# Patient Record
Sex: FEMALE | Race: WHITE | NOT HISPANIC OR LATINO | Employment: OTHER | URBAN - METROPOLITAN AREA
[De-identification: names, ages, dates, MRNs, and addresses within clinical notes are randomized per-mention and may not be internally consistent; named-entity substitution may affect disease eponyms.]

---

## 2019-02-23 ENCOUNTER — APPOINTMENT (OUTPATIENT)
Dept: RADIOLOGY | Facility: MEDICAL CENTER | Age: 79
End: 2019-02-23
Attending: EMERGENCY MEDICINE
Payer: MEDICARE

## 2019-02-23 ENCOUNTER — HOSPITAL ENCOUNTER (OUTPATIENT)
Facility: MEDICAL CENTER | Age: 79
End: 2019-02-24
Attending: EMERGENCY MEDICINE | Admitting: INTERNAL MEDICINE
Payer: MEDICARE

## 2019-02-23 DIAGNOSIS — R55 SYNCOPE, UNSPECIFIED SYNCOPE TYPE: ICD-10-CM

## 2019-02-23 PROBLEM — I10 ESSENTIAL HYPERTENSION: Status: ACTIVE | Noted: 2019-02-23

## 2019-02-23 PROBLEM — D64.9 ANEMIA: Status: ACTIVE | Noted: 2019-02-23

## 2019-02-23 PROBLEM — E03.9 HYPOTHYROIDISM: Status: ACTIVE | Noted: 2019-02-23

## 2019-02-23 LAB
ALBUMIN SERPL BCP-MCNC: 4.1 G/DL (ref 3.2–4.9)
ALBUMIN/GLOB SERPL: 1.4 G/DL
ALP SERPL-CCNC: 66 U/L (ref 30–99)
ALT SERPL-CCNC: 12 U/L (ref 2–50)
ANION GAP SERPL CALC-SCNC: 8 MMOL/L (ref 0–11.9)
APPEARANCE UR: CLEAR
APTT PPP: 43 SEC (ref 24.7–36)
AST SERPL-CCNC: 21 U/L (ref 12–45)
BACTERIA #/AREA URNS HPF: NEGATIVE /HPF
BASOPHILS # BLD AUTO: 1.3 % (ref 0–1.8)
BASOPHILS # BLD: 0.07 K/UL (ref 0–0.12)
BILIRUB SERPL-MCNC: 0.6 MG/DL (ref 0.1–1.5)
BILIRUB UR QL STRIP.AUTO: NEGATIVE
BUN SERPL-MCNC: 14 MG/DL (ref 8–22)
CALCIUM SERPL-MCNC: 10.3 MG/DL (ref 8.5–10.5)
CHLORIDE SERPL-SCNC: 107 MMOL/L (ref 96–112)
CO2 SERPL-SCNC: 24 MMOL/L (ref 20–33)
COLOR UR: YELLOW
CREAT SERPL-MCNC: 0.78 MG/DL (ref 0.5–1.4)
EKG IMPRESSION: NORMAL
EOSINOPHIL # BLD AUTO: 0.13 K/UL (ref 0–0.51)
EOSINOPHIL NFR BLD: 2.5 % (ref 0–6.9)
EPI CELLS #/AREA URNS HPF: NEGATIVE /HPF
ERYTHROCYTE [DISTWIDTH] IN BLOOD BY AUTOMATED COUNT: 41.4 FL (ref 35.9–50)
GLOBULIN SER CALC-MCNC: 2.9 G/DL (ref 1.9–3.5)
GLUCOSE SERPL-MCNC: 91 MG/DL (ref 65–99)
GLUCOSE UR STRIP.AUTO-MCNC: NEGATIVE MG/DL
HCT VFR BLD AUTO: 31.2 % (ref 37–47)
HGB BLD-MCNC: 10.5 G/DL (ref 12–16)
HYALINE CASTS #/AREA URNS LPF: ABNORMAL /LPF
IMM GRANULOCYTES # BLD AUTO: 0.01 K/UL (ref 0–0.11)
IMM GRANULOCYTES NFR BLD AUTO: 0.2 % (ref 0–0.9)
INR PPP: 0.98 (ref 0.87–1.13)
KETONES UR STRIP.AUTO-MCNC: NEGATIVE MG/DL
LEUKOCYTE ESTERASE UR QL STRIP.AUTO: ABNORMAL
LYMPHOCYTES # BLD AUTO: 2.49 K/UL (ref 1–4.8)
LYMPHOCYTES NFR BLD: 47 % (ref 22–41)
MCH RBC QN AUTO: 31 PG (ref 27–33)
MCHC RBC AUTO-ENTMCNC: 33.7 G/DL (ref 33.6–35)
MCV RBC AUTO: 92 FL (ref 81.4–97.8)
MICRO URNS: ABNORMAL
MONOCYTES # BLD AUTO: 0.68 K/UL (ref 0–0.85)
MONOCYTES NFR BLD AUTO: 12.8 % (ref 0–13.4)
NEUTROPHILS # BLD AUTO: 1.92 K/UL (ref 2–7.15)
NEUTROPHILS NFR BLD: 36.2 % (ref 44–72)
NITRITE UR QL STRIP.AUTO: NEGATIVE
NRBC # BLD AUTO: 0 K/UL
NRBC BLD-RTO: 0 /100 WBC
PH UR STRIP.AUTO: 7.5 [PH]
PLATELET # BLD AUTO: 255 K/UL (ref 164–446)
PMV BLD AUTO: 9.4 FL (ref 9–12.9)
POTASSIUM SERPL-SCNC: 3.6 MMOL/L (ref 3.6–5.5)
PROT SERPL-MCNC: 7 G/DL (ref 6–8.2)
PROT UR QL STRIP: NEGATIVE MG/DL
PROTHROMBIN TIME: 13.1 SEC (ref 12–14.6)
RBC # BLD AUTO: 3.39 M/UL (ref 4.2–5.4)
RBC # URNS HPF: ABNORMAL /HPF
RBC UR QL AUTO: NEGATIVE
SODIUM SERPL-SCNC: 139 MMOL/L (ref 135–145)
SP GR UR STRIP.AUTO: 1.01
TROPONIN I SERPL-MCNC: <0.01 NG/ML (ref 0–0.04)
UROBILINOGEN UR STRIP.AUTO-MCNC: 0.2 MG/DL
WBC # BLD AUTO: 5.3 K/UL (ref 4.8–10.8)
WBC #/AREA URNS HPF: ABNORMAL /HPF

## 2019-02-23 PROCEDURE — 36415 COLL VENOUS BLD VENIPUNCTURE: CPT

## 2019-02-23 PROCEDURE — 99220 PR INITIAL OBSERVATION CARE,LEVL III: CPT | Performed by: INTERNAL MEDICINE

## 2019-02-23 PROCEDURE — 700111 HCHG RX REV CODE 636 W/ 250 OVERRIDE (IP): Performed by: EMERGENCY MEDICINE

## 2019-02-23 PROCEDURE — 700101 HCHG RX REV CODE 250: Performed by: INTERNAL MEDICINE

## 2019-02-23 PROCEDURE — 84484 ASSAY OF TROPONIN QUANT: CPT

## 2019-02-23 PROCEDURE — 85730 THROMBOPLASTIN TIME PARTIAL: CPT

## 2019-02-23 PROCEDURE — 99285 EMERGENCY DEPT VISIT HI MDM: CPT

## 2019-02-23 PROCEDURE — 96374 THER/PROPH/DIAG INJ IV PUSH: CPT

## 2019-02-23 PROCEDURE — 85610 PROTHROMBIN TIME: CPT

## 2019-02-23 PROCEDURE — 93005 ELECTROCARDIOGRAM TRACING: CPT | Performed by: EMERGENCY MEDICINE

## 2019-02-23 PROCEDURE — G0378 HOSPITAL OBSERVATION PER HR: HCPCS

## 2019-02-23 PROCEDURE — 700105 HCHG RX REV CODE 258: Performed by: EMERGENCY MEDICINE

## 2019-02-23 PROCEDURE — 85025 COMPLETE CBC W/AUTO DIFF WBC: CPT

## 2019-02-23 PROCEDURE — 71045 X-RAY EXAM CHEST 1 VIEW: CPT

## 2019-02-23 PROCEDURE — 80053 COMPREHEN METABOLIC PANEL: CPT

## 2019-02-23 PROCEDURE — 81001 URINALYSIS AUTO W/SCOPE: CPT

## 2019-02-23 RX ORDER — ONDANSETRON 2 MG/ML
4 INJECTION INTRAMUSCULAR; INTRAVENOUS EVERY 4 HOURS PRN
Status: DISCONTINUED | OUTPATIENT
Start: 2019-02-23 | End: 2019-02-24 | Stop reason: HOSPADM

## 2019-02-23 RX ORDER — ONDANSETRON 2 MG/ML
4 INJECTION INTRAMUSCULAR; INTRAVENOUS ONCE
Status: COMPLETED | OUTPATIENT
Start: 2019-02-23 | End: 2019-02-23

## 2019-02-23 RX ORDER — UREA 10 %
500 LOTION (ML) TOPICAL DAILY
Status: DISCONTINUED | OUTPATIENT
Start: 2019-02-24 | End: 2019-02-24 | Stop reason: HOSPADM

## 2019-02-23 RX ORDER — MAGNESIUM GLUCONATE 27 MG(500)
500 TABLET ORAL DAILY
COMMUNITY

## 2019-02-23 RX ORDER — AMLODIPINE BESYLATE 5 MG/1
5 TABLET ORAL DAILY
COMMUNITY

## 2019-02-23 RX ORDER — POLYETHYLENE GLYCOL 3350 17 G/17G
1 POWDER, FOR SOLUTION ORAL
Status: DISCONTINUED | OUTPATIENT
Start: 2019-02-23 | End: 2019-02-24

## 2019-02-23 RX ORDER — ACETAMINOPHEN 325 MG/1
650 TABLET ORAL EVERY 6 HOURS PRN
Status: DISCONTINUED | OUTPATIENT
Start: 2019-02-23 | End: 2019-02-24 | Stop reason: HOSPADM

## 2019-02-23 RX ORDER — CHOLECALCIFEROL (VITAMIN D3) 50 MCG
2000 TABLET ORAL DAILY
COMMUNITY

## 2019-02-23 RX ORDER — SODIUM CHLORIDE AND POTASSIUM CHLORIDE 150; 900 MG/100ML; MG/100ML
INJECTION, SOLUTION INTRAVENOUS CONTINUOUS
Status: DISCONTINUED | OUTPATIENT
Start: 2019-02-23 | End: 2019-02-24

## 2019-02-23 RX ORDER — ONDANSETRON 4 MG/1
4 TABLET, ORALLY DISINTEGRATING ORAL EVERY 4 HOURS PRN
Status: DISCONTINUED | OUTPATIENT
Start: 2019-02-23 | End: 2019-02-24 | Stop reason: HOSPADM

## 2019-02-23 RX ORDER — LISINOPRIL 20 MG/1
20 TABLET ORAL DAILY
COMMUNITY

## 2019-02-23 RX ORDER — SODIUM CHLORIDE 9 MG/ML
1000 INJECTION, SOLUTION INTRAVENOUS ONCE
Status: COMPLETED | OUTPATIENT
Start: 2019-02-23 | End: 2019-02-23

## 2019-02-23 RX ORDER — LEVOTHYROXINE SODIUM 0.12 MG/1
62.5 TABLET ORAL
Status: DISCONTINUED | OUTPATIENT
Start: 2019-02-24 | End: 2019-02-24 | Stop reason: HOSPADM

## 2019-02-23 RX ORDER — LEVOTHYROXINE SODIUM 0.12 MG/1
62.5 TABLET ORAL
COMMUNITY

## 2019-02-23 RX ORDER — AMOXICILLIN 250 MG
2 CAPSULE ORAL 2 TIMES DAILY
Status: DISCONTINUED | OUTPATIENT
Start: 2019-02-23 | End: 2019-02-24

## 2019-02-23 RX ORDER — BISACODYL 10 MG
10 SUPPOSITORY, RECTAL RECTAL
Status: DISCONTINUED | OUTPATIENT
Start: 2019-02-23 | End: 2019-02-24

## 2019-02-23 RX ADMIN — POTASSIUM CHLORIDE AND SODIUM CHLORIDE: 900; 150 INJECTION, SOLUTION INTRAVENOUS at 22:27

## 2019-02-23 RX ADMIN — ONDANSETRON 4 MG: 2 INJECTION INTRAMUSCULAR; INTRAVENOUS at 19:26

## 2019-02-23 RX ADMIN — SODIUM CHLORIDE 1000 ML: 9 INJECTION, SOLUTION INTRAVENOUS at 19:26

## 2019-02-23 ASSESSMENT — ENCOUNTER SYMPTOMS
FEVER: 0
WHEEZING: 0
FLANK PAIN: 0
NAUSEA: 1
DIZZINESS: 1
SEIZURES: 0
LOSS OF CONSCIOUSNESS: 1
FOCAL WEAKNESS: 0
SORE THROAT: 0
BLURRED VISION: 0
BRUISES/BLEEDS EASILY: 0
NECK PAIN: 0
PALPITATIONS: 0
BLOOD IN STOOL: 0
SHORTNESS OF BREATH: 0
ABDOMINAL PAIN: 0
MYALGIAS: 0
DIARRHEA: 0
VOMITING: 0
DIAPHORESIS: 0
SPUTUM PRODUCTION: 0
HEADACHES: 0
CHILLS: 0
COUGH: 0
BACK PAIN: 0

## 2019-02-23 ASSESSMENT — LIFESTYLE VARIABLES
HOW MANY TIMES IN THE PAST YEAR HAVE YOU HAD 5 OR MORE DRINKS IN A DAY: 0
EVER FELT BAD OR GUILTY ABOUT YOUR DRINKING: NO
TOTAL SCORE: 0
TOTAL SCORE: 0
AVERAGE NUMBER OF DAYS PER WEEK YOU HAVE A DRINK CONTAINING ALCOHOL: 1
ON A TYPICAL DAY WHEN YOU DRINK ALCOHOL HOW MANY DRINKS DO YOU HAVE: 2
CONSUMPTION TOTAL: NEGATIVE
TOTAL SCORE: 0
EVER_SMOKED: YES
EVER HAD A DRINK FIRST THING IN THE MORNING TO STEADY YOUR NERVES TO GET RID OF A HANGOVER: NO
HAVE YOU EVER FELT YOU SHOULD CUT DOWN ON YOUR DRINKING: NO
HAVE PEOPLE ANNOYED YOU BY CRITICIZING YOUR DRINKING: NO
ALCOHOL_USE: YES

## 2019-02-23 ASSESSMENT — COPD QUESTIONNAIRES
DO YOU EVER COUGH UP ANY MUCUS OR PHLEGM?: NO/ONLY WITH OCCASIONAL COLDS OR INFECTIONS
IN THE PAST 12 MONTHS DO YOU DO LESS THAN YOU USED TO BECAUSE OF YOUR BREATHING PROBLEMS: DISAGREE/UNSURE
COPD SCREENING SCORE: 2
HAVE YOU SMOKED AT LEAST 100 CIGARETTES IN YOUR ENTIRE LIFE: NO/DON'T KNOW
DURING THE PAST 4 WEEKS HOW MUCH DID YOU FEEL SHORT OF BREATH: NONE/LITTLE OF THE TIME

## 2019-02-23 ASSESSMENT — PATIENT HEALTH QUESTIONNAIRE - PHQ9
1. LITTLE INTEREST OR PLEASURE IN DOING THINGS: NOT AT ALL
SUM OF ALL RESPONSES TO PHQ9 QUESTIONS 1 AND 2: 0
2. FEELING DOWN, DEPRESSED, IRRITABLE, OR HOPELESS: NOT AT ALL

## 2019-02-24 ENCOUNTER — APPOINTMENT (OUTPATIENT)
Dept: CARDIOLOGY | Facility: MEDICAL CENTER | Age: 79
End: 2019-02-24
Attending: INTERNAL MEDICINE
Payer: MEDICARE

## 2019-02-24 VITALS
HEART RATE: 74 BPM | RESPIRATION RATE: 14 BRPM | BODY MASS INDEX: 25.07 KG/M2 | TEMPERATURE: 99.9 F | WEIGHT: 136.24 LBS | OXYGEN SATURATION: 96 % | SYSTOLIC BLOOD PRESSURE: 166 MMHG | DIASTOLIC BLOOD PRESSURE: 85 MMHG | HEIGHT: 62 IN

## 2019-02-24 PROBLEM — R55 SYNCOPE: Status: RESOLVED | Noted: 2019-02-23 | Resolved: 2019-02-24

## 2019-02-24 LAB
ANION GAP SERPL CALC-SCNC: 5 MMOL/L (ref 0–11.9)
BASOPHILS # BLD AUTO: 0.7 % (ref 0–1.8)
BASOPHILS # BLD: 0.04 K/UL (ref 0–0.12)
BUN SERPL-MCNC: 15 MG/DL (ref 8–22)
CALCIUM SERPL-MCNC: 9.5 MG/DL (ref 8.5–10.5)
CHLORIDE SERPL-SCNC: 113 MMOL/L (ref 96–112)
CO2 SERPL-SCNC: 23 MMOL/L (ref 20–33)
CREAT SERPL-MCNC: 0.66 MG/DL (ref 0.5–1.4)
EKG IMPRESSION: NORMAL
EOSINOPHIL # BLD AUTO: 0.06 K/UL (ref 0–0.51)
EOSINOPHIL NFR BLD: 1.1 % (ref 0–6.9)
ERYTHROCYTE [DISTWIDTH] IN BLOOD BY AUTOMATED COUNT: 42.3 FL (ref 35.9–50)
FOLATE SERPL-MCNC: 9.5 NG/ML
GLUCOSE SERPL-MCNC: 105 MG/DL (ref 65–99)
HCT VFR BLD AUTO: 29.2 % (ref 37–47)
HGB BLD-MCNC: 9.7 G/DL (ref 12–16)
IMM GRANULOCYTES # BLD AUTO: 0.05 K/UL (ref 0–0.11)
IMM GRANULOCYTES NFR BLD AUTO: 0.9 % (ref 0–0.9)
IRON SATN MFR SERPL: 16 % (ref 15–55)
IRON SERPL-MCNC: 55 UG/DL (ref 40–170)
LV EJECT FRACT  99904: 70
LV EJECT FRACT MOD 2C 99903: 73.09
LV EJECT FRACT MOD 4C 99902: 71.85
LV EJECT FRACT MOD BP 99901: 73.71
LYMPHOCYTES # BLD AUTO: 1.99 K/UL (ref 1–4.8)
LYMPHOCYTES NFR BLD: 36.1 % (ref 22–41)
MAGNESIUM SERPL-MCNC: 2.2 MG/DL (ref 1.5–2.5)
MCH RBC QN AUTO: 30.5 PG (ref 27–33)
MCHC RBC AUTO-ENTMCNC: 33.2 G/DL (ref 33.6–35)
MCV RBC AUTO: 91.8 FL (ref 81.4–97.8)
MONOCYTES # BLD AUTO: 0.57 K/UL (ref 0–0.85)
MONOCYTES NFR BLD AUTO: 10.3 % (ref 0–13.4)
NEUTROPHILS # BLD AUTO: 2.8 K/UL (ref 2–7.15)
NEUTROPHILS NFR BLD: 50.9 % (ref 44–72)
NRBC # BLD AUTO: 0 K/UL
NRBC BLD-RTO: 0 /100 WBC
PLATELET # BLD AUTO: 233 K/UL (ref 164–446)
PMV BLD AUTO: 9.5 FL (ref 9–12.9)
POTASSIUM SERPL-SCNC: 4.3 MMOL/L (ref 3.6–5.5)
RBC # BLD AUTO: 3.18 M/UL (ref 4.2–5.4)
SODIUM SERPL-SCNC: 141 MMOL/L (ref 135–145)
TIBC SERPL-MCNC: 342 UG/DL (ref 250–450)
TROPONIN I SERPL-MCNC: <0.01 NG/ML (ref 0–0.04)
TSH SERPL DL<=0.005 MIU/L-ACNC: 3.25 UIU/ML (ref 0.38–5.33)
VIT B12 SERPL-MCNC: 259 PG/ML (ref 211–911)
WBC # BLD AUTO: 5.5 K/UL (ref 4.8–10.8)

## 2019-02-24 PROCEDURE — 83540 ASSAY OF IRON: CPT

## 2019-02-24 PROCEDURE — 99217 PR OBSERVATION CARE DISCHARGE: CPT | Performed by: INTERNAL MEDICINE

## 2019-02-24 PROCEDURE — 97161 PT EVAL LOW COMPLEX 20 MIN: CPT

## 2019-02-24 PROCEDURE — 93306 TTE W/DOPPLER COMPLETE: CPT | Mod: 26 | Performed by: INTERNAL MEDICINE

## 2019-02-24 PROCEDURE — 83735 ASSAY OF MAGNESIUM: CPT

## 2019-02-24 PROCEDURE — 93306 TTE W/DOPPLER COMPLETE: CPT

## 2019-02-24 PROCEDURE — 84443 ASSAY THYROID STIM HORMONE: CPT

## 2019-02-24 PROCEDURE — 85025 COMPLETE CBC W/AUTO DIFF WBC: CPT

## 2019-02-24 PROCEDURE — 93005 ELECTROCARDIOGRAM TRACING: CPT | Performed by: INTERNAL MEDICINE

## 2019-02-24 PROCEDURE — 93010 ELECTROCARDIOGRAM REPORT: CPT | Performed by: INTERNAL MEDICINE

## 2019-02-24 PROCEDURE — A9270 NON-COVERED ITEM OR SERVICE: HCPCS | Performed by: INTERNAL MEDICINE

## 2019-02-24 PROCEDURE — G0378 HOSPITAL OBSERVATION PER HR: HCPCS

## 2019-02-24 PROCEDURE — 700101 HCHG RX REV CODE 250: Performed by: INTERNAL MEDICINE

## 2019-02-24 PROCEDURE — 84484 ASSAY OF TROPONIN QUANT: CPT

## 2019-02-24 PROCEDURE — 83550 IRON BINDING TEST: CPT

## 2019-02-24 PROCEDURE — 82607 VITAMIN B-12: CPT

## 2019-02-24 PROCEDURE — 82746 ASSAY OF FOLIC ACID SERUM: CPT

## 2019-02-24 PROCEDURE — 80048 BASIC METABOLIC PNL TOTAL CA: CPT

## 2019-02-24 PROCEDURE — 700102 HCHG RX REV CODE 250 W/ 637 OVERRIDE(OP): Performed by: INTERNAL MEDICINE

## 2019-02-24 RX ADMIN — Medication 500 MG: at 05:46

## 2019-02-24 RX ADMIN — POTASSIUM CHLORIDE AND SODIUM CHLORIDE: 900; 150 INJECTION, SOLUTION INTRAVENOUS at 06:35

## 2019-02-24 RX ADMIN — LEVOTHYROXINE SODIUM 62.5 MCG: 125 TABLET ORAL at 05:45

## 2019-02-24 RX ADMIN — VITAMIN D, TAB 1000IU (100/BT) 2000 UNITS: 25 TAB at 05:47

## 2019-02-24 ASSESSMENT — GAIT ASSESSMENTS
DISTANCE (FEET): 150
GAIT LEVEL OF ASSIST: SUPERVISED

## 2019-02-24 ASSESSMENT — COGNITIVE AND FUNCTIONAL STATUS - GENERAL
MOBILITY SCORE: 24
SUGGESTED CMS G CODE MODIFIER MOBILITY: CH

## 2019-02-24 NOTE — PROGRESS NOTES
IV dc'd.  Discharge instructions given to patient; patient verbalizes understanding, all questions answered.  Copy of DC summary provided, signed copy in chart.  Pt states personal belongings are in possession.  Pt escorted off unit by unit clerk without incident.

## 2019-02-24 NOTE — PROGRESS NOTES
Patient picked up from ED in W/C and put on telemetry. Transported to T210. Patient alert and oriented. No complaints of pain. No distress noted.

## 2019-02-24 NOTE — PROGRESS NOTES
Patient oriented to CDU and put on telemetry. VS taken and stable. Patient alert and oriented. Call bell education given.

## 2019-02-24 NOTE — H&P
"Hospital Medicine History & Physical Note    Date of Service  2/23/2019    Primary Care Physician  Pcp Not In Computer    Consultants  None    Code Status  Full code    Chief Complaint  Syncope    History of Presenting Illness  78 y.o. female with a past medical history of hypertension, hypothyroidism who presented 2/23/2019 with a syncopal episode.  The patient was at [facility] male with her  and had 3 alcoholic beverages which is unusual for her and later felt dizziness while she was sitting at a slot machine.  On getting up from her seat the patient had a syncopal episode.  She did not injure her head.  There was no witnessed seizure-like activity, tongue biting or incontinence.  She denied any preceding chest pain.  On waking up the patient reported nausea and feeling \"cloudy\".  She denied any focal muscle weakness, numbness, tingling, vision changes or headache.     EKG interpreted by me reveals sinus rhythm with no acute ischemic changes  Chest x-ray interpreted by me reveals no acute cardiopulmonary process    Review of Systems  Review of Systems   Constitutional: Negative for chills, diaphoresis and fever.   HENT: Negative for hearing loss and sore throat.    Eyes: Negative for blurred vision.   Respiratory: Negative for cough, sputum production, shortness of breath and wheezing.    Cardiovascular: Negative for chest pain, palpitations and leg swelling.   Gastrointestinal: Positive for nausea. Negative for abdominal pain, blood in stool, diarrhea and vomiting.   Genitourinary: Negative for dysuria, flank pain and urgency.   Musculoskeletal: Negative for back pain, joint pain, myalgias and neck pain.   Skin: Negative for rash.   Neurological: Positive for dizziness and loss of consciousness. Negative for focal weakness, seizures and headaches.   Endo/Heme/Allergies: Does not bruise/bleed easily.   Psychiatric/Behavioral: Negative for suicidal ideas.   All other systems reviewed and are negative.      Past " Medical History   has a past medical history of Bronchitis (1995); Hypertension; Hypothyroid; Indigestion; Pneumonia (1990); Renal disorder (1962); and TIA (transient ischemic attack). She also has no past medical history of Angina; Arrhythmia; Arthritis; ASTHMA; Backpain; Cancer (HCC); CATARACT; Congestive heart failure (HCC); COPD; Diabetes; Dialysis; Fall; Glaucoma; Heart murmur; Heart valve disease; Infectious disease; Jaundice; Myocardial infarct (HCC); Other specified symptom associated with female genital organs; Pacemaker; Personal history of venous thrombosis and embolism; Psychiatric problem; Rheumatic fever; Seizure (HCC); Stroke (HCC); Unspecified hemorrhagic conditions; or Unspecified urinary incontinence.    Surgical History   has a past surgical history that includes hernia repair and other abdominal surgery (2002).     Family History  No pertinent family history    Social History   reports that she has quit smoking. She has never used smokeless tobacco. She reports that she drinks alcohol. She reports that she does not use drugs.    Allergies  Allergies   Allergen Reactions   • Pcn [Penicillins] Hives       Medications  Prior to Admission Medications   Prescriptions Last Dose Informant Patient Reported? Taking?   Calcium-Magnesium-Vitamin D (CALCIUM 500 PO) 2/23/2019 at am Patient Yes Yes   Sig: Take 1 Tab by mouth every day.   Cholecalciferol (VITAMIN D) 2000 UNIT Tab 2/23/2019 at am Patient Yes Yes   Sig: Take 2,000 Units by mouth every day.   amLODIPine (NORVASC) 5 MG Tab 2/23/2019 at am Patient Yes Yes   Sig: Take 5 mg by mouth every day.   levothyroxine (SYNTHROID) 125 MCG Tab 2/23/2019 at am Patient Yes Yes   Sig: Take 62.5 mcg by mouth Every morning on an empty stomach.   lisinopril (PRINIVIL) 20 MG Tab 2/23/2019 at am Patient Yes Yes   Sig: Take 20 mg by mouth every day.   magnesium gluconate (MAG-G) 500 MG tablet 2/23/2019 at am Patient Yes Yes   Sig: Take 500 mg by mouth every day.    psyllium (METAMUCIL) 58.12 % Pack prn at prn Patient Yes Yes   Sig: Take 1 Packet by mouth as needed.      Facility-Administered Medications: None       Physical Exam  Temp:  [36.6 °C (97.9 °F)] 36.6 °C (97.9 °F)  Pulse:  [68-85] 68  Resp:  [16] 16  BP: (107)/(74) 107/74  SpO2:  [93 %-99 %] 99 %    Physical Exam   Constitutional: She is oriented to person, place, and time. She appears well-developed and well-nourished. No distress.   HENT:   Head: Normocephalic and atraumatic.   Dry oral mucous membranes   Eyes: Pupils are equal, round, and reactive to light. Conjunctivae are normal.   Neck: Neck supple. No thyromegaly present.   Cardiovascular: Normal rate, regular rhythm and normal heart sounds.    Pulmonary/Chest: Effort normal and breath sounds normal. No respiratory distress. She has no wheezes. She has no rales.   Abdominal: Soft. Bowel sounds are normal. She exhibits no distension. There is no tenderness. There is no rebound.   Musculoskeletal: Normal range of motion. She exhibits no edema or tenderness.   Neurological: She is alert and oriented to person, place, and time. No cranial nerve deficit. Coordination normal.   Strength and sensation intact bilaterally   Skin: Skin is warm and dry.   Psychiatric: She has a normal mood and affect. Her behavior is normal.   Nursing note and vitals reviewed.      Laboratory:  Recent Labs      02/23/19 1910   WBC  5.3   RBC  3.39*   HEMOGLOBIN  10.5*   HEMATOCRIT  31.2*   MCV  92.0   MCH  31.0   MCHC  33.7   RDW  41.4   PLATELETCT  255   MPV  9.4     Recent Labs      02/23/19 1910   SODIUM  139   POTASSIUM  3.6   CHLORIDE  107   CO2  24   GLUCOSE  91   BUN  14   CREATININE  0.78   CALCIUM  10.3     Recent Labs      02/23/19 1910   ALTSGPT  12   ASTSGOT  21   ALKPHOSPHAT  66   TBILIRUBIN  0.6   GLUCOSE  91     Recent Labs      02/23/19 1910   APTT  43.0*   INR  0.98             Recent Labs      02/23/19 1910   TROPONINI  <0.01       Urinalysis:    No results  found     Imaging:  DX-CHEST-PORTABLE (1 VIEW)   Final Result      No acute cardiopulmonary process is seen.      EC-ECHOCARDIOGRAM COMPLETE W/O CONT    (Results Pending)         Assessment/Plan:  I anticipate this patient is appropriate for observation status at this time.    Syncope- (present on admission)   Assessment & Plan    Likely vasovagal, rule out cardiogenic causes  Continuous cardiac monitoring to evaluate for arrhythmias  Check 2D echo  Check orthostatics  IV fluid hydration with normal saline     Anemia- (present on admission)   Assessment & Plan    Digital rectal examination noted stool that was Hemoccult negative  Patient denies any bleeding or melena  Check iron studies, folate, B12 and TSH  Monitor CBC, transfuse for hemoglobin less than 7       Hypothyroidism- (present on admission)   Assessment & Plan    Check TSH  Continue Synthroid 62.5     Essential hypertension- (present on admission)   Assessment & Plan    Blood pressure is currently marginal, hold home regimen of oral antihypertensives at this time         VTE prophylaxis: SCD

## 2019-02-24 NOTE — ASSESSMENT & PLAN NOTE
Likely vasovagal, rule out cardiogenic causes  Continuous cardiac monitoring to evaluate for arrhythmias  Check 2D echo  Check orthostatics  IV fluid hydration with normal saline

## 2019-02-24 NOTE — PROGRESS NOTES
Patient assisted to bathroom and back to bed. No distress noted. Call bell put back within reach of patient.

## 2019-02-24 NOTE — THERAPY
"Physical Therapy Evaluation completed.   Bed Mobility:  Supine to Sit: Independent  Transfers: Sit to Stand: Supervised  Gait: Level Of Assist: Supervised with No Equipment Needed       Plan of Care: Patient with no further skilled PT needs in the acute care setting at this time and Patient demonstrates safety with mobility in this environment at this time.   Discharge Recommendations: Equipment: No Equipment Needed. Post-acute therapy Currently anticipate no further skilled therapy needs once patient is discharged from the inpatient setting.    See \"Rehab Therapy-Acute\" Patient Summary Report for complete documentation.       Pt is a 77 y/o female who was admitted with c/o syncope which she states happened after 3 drinks followed by sitting at a slot machine. Pt normally resides in [place]. Pt reports symptoms have since resolved and she feel back to her baseline. Pt with good balance, activity tolerance and safety with functional mobility and has no further acute PT needs at this time. Pt appears safe to DC home with spouse from a PT perspective.   "

## 2019-02-24 NOTE — DISCHARGE PLANNING
Care Transition Team Assessment    Met with pt and  David, at bedside. According to pt she lives with David and their 52 yr old son who has PTSD.    Pt reports she is independent at baseline, denies use of assistive devices.    No obvious needs identified at this time. David confirmed he will provide transport at discharge.    Information Source  Orientation : Oriented x 4  Information Given By: Patient    Readmission Evaluation  Is this a readmission?: No    Interdisciplinary Discharge Planning  Does Admitting Nurse Feel This Could be a Complex Discharge?: No  Primary Care Physician: Eva Calderon  Lives with - Patient's Self Care Capacity: Spouse, Adult Children (Adult son with PTSD)  Patient or legal guardian wants to designate a caregiver (see row info): No  Support Systems: Spouse / Significant Other, Children  Housing / Facility: [address]  Do You Take your Prescribed Medications Regularly: Yes  Able to Return to Previous ADL's: Yes  Mobility Issues: No  Prior Services: None  Patient Expects to be Discharged to:: Home  Assistance Needed: No  Durable Medical Equipment: Not Applicable    Discharge Preparedness  What is your plan after discharge?:  (Anticipate discharge to home)  What are your discharge supports?: Spouse  Prior Functional Level: Ambulatory, Independent with Activities of Daily Living, Independent with Medication Management  Difficulity with ADLs: None  Difficulity with IADLs: None    Functional Assesment  Prior Functional Level: Ambulatory, Independent with Activities of Daily Living, Independent with Medication Management    Finances  Prescription Coverage: Yes    Vision / Hearing Impairment  Vision Impairment : No  Hearing Impairment : No    Values / Beliefs / Concerns  Values / Beliefs Concerns : No    Discharge Risks or Barriers  Discharge risks or barriers?: No    Anticipated Discharge Information  Anticipated discharge disposition: Home  Discharge Address: [address], [place]  Discharge Contact Phone Number: [phone]

## 2019-02-24 NOTE — ASSESSMENT & PLAN NOTE
Digital rectal examination noted stool that was Hemoccult negative  Patient denies any bleeding or melena  Check iron studies, folate, B12 and TSH  Monitor CBC, transfuse for hemoglobin less than 7

## 2019-02-24 NOTE — DISCHARGE INSTRUCTIONS
Discharge Instructions    Discharged to home by car with relative. Discharged via wheelchair, hospital escort: Yes.  Special equipment needed: Not Applicable    Be sure to schedule a follow-up appointment with your primary care doctor or any specialists as instructed.     Discharge Plan:   Diet Plan: Discussed  Activity Level: Discussed  Confirmed Follow up Appointment: Patient to Call and Schedule Appointment  Confirmed Symptoms Management: Discussed  Medication Reconciliation Updated: Yes  Pneumococcal Vaccine Administered/Refused: Not given - Patient refused pneumococcal vaccine  Influenza Vaccine Indication: Patient Refuses    I understand that a diet low in cholesterol, fat, and sodium is recommended for good health. Unless I have been given specific instructions below for another diet, I accept this instruction as my diet prescription.   Other diet: Heart healthy    Special Instructions: None    · Is patient discharged on Warfarin / Coumadin?   No     Depression / Suicide Risk    As you are discharged from this [facility] facility, it is important to learn how to keep safe from harming yourself.    Recognize the warning signs:  · Abrupt changes in personality, positive or negative- including increase in energy   · Giving away possessions  · Change in eating patterns- significant weight changes-  positive or negative  · Change in sleeping patterns- unable to sleep or sleeping all the time   · Unwillingness or inability to communicate  · Depression  · Unusual sadness, discouragement and loneliness  · Talk of wanting to die  · Neglect of personal appearance   · Rebelliousness- reckless behavior  · Withdrawal from people/activities they love  · Confusion- inability to concentrate     If you or a loved one observes any of these behaviors or has concerns about self-harm, here's what you can do:  · Talk about it- your feelings and reasons for harming yourself  · Remove any means that you might use to hurt yourself  (examples: pills, rope, extension cords, firearm)  · Get professional help from the community (Mental Health, Substance Abuse, psychological counseling)  · Do not be alone:Call your Safe Contact- someone whom you trust who will be there for you.  · Call your local CRISIS HOTLINE 091-9684 or 821-434-0970  · Call your local Children's Mobile Crisis Response Team [place] (040) 114-1922 or www.[employer]  · Call the toll free National Suicide Prevention Hotlines   · National Suicide Prevention Lifeline 997-257-CDSX (7445)  · National Hope Line Network 800-SUICIDE (536-2771)

## 2019-02-24 NOTE — ED PROVIDER NOTES
"ED Provider Note    Scribed for Emma Stephens M.D. by Pasha Sims. 2/23/2019, 7:14 PM.    Primary care provider: None noted  Means of arrival: EMS  History obtained from: Patient  History limited by: None    CHIEF COMPLAINT  Chief Complaint   Patient presents with   • Syncope       HPI  Kim Aguiar is a 78 y.o. female who presents to the Emergency Department after suffering a syncopal episode just prior to arrival. She states she was at the [facility], sitting at a slot machine when she began to feel dizzy, and noticed she was having some difficulty hearing. At this time she told her  that she would like to return to the room an may need a wheelchair to help her. This is the last thing she remembers before arriving here at [facility] via EMS. Kim states she has been drinking enough water and ate today. She did however have three alcoholic beverages which she states is abnormal for her as she and her  quit drinking and she only drinks on occasion. At this time Kim feels nauseated and somewhat confused which she describes as feeling \"cloudy\". She denies any abdominal pain or vomiting.    REVIEW OF SYSTEMS  Pertinent positives include syncope, nausea, confusion, alcohol consumption. Pertinent negatives include no abdominal pain, vomiting. All other systems reviewed and negative.     PAST MEDICAL HISTORY   has a past medical history of Bronchitis (1995); Hypertension; Hypothyroid; Indigestion; Pneumonia (1990); Renal disorder (1962); and TIA (transient ischemic attack).    SURGICAL HISTORY   has a past surgical history that includes hernia repair and other abdominal surgery (2002).    SOCIAL HISTORY  Social History   Substance Use Topics   • Smoking status: Former Smoker   • Smokeless tobacco: Never Used      Comment: 1995, Hx of 6 cigarettes daily X 15 years   • Alcohol use Yes      Comment: couple times monthly      History   Drug Use No       FAMILY HISTORY  Family history reviewed. " "Family history not pertinent.      CURRENT MEDICATIONS  Home Medications     Reviewed by Usha Burnham, Pharmacy Intern (Pharmacy Intern) on 02/23/19 at 2000  Med List Status: Complete   Medication Last Dose Status   amLODIPine (NORVASC) 5 MG Tab 2/23/2019 Active   Calcium-Magnesium-Vitamin D (CALCIUM 500 PO) 2/23/2019 Active   Cholecalciferol (VITAMIN D) 2000 UNIT Tab 2/23/2019 Active   levothyroxine (SYNTHROID) 125 MCG Tab 2/23/2019 Active   lisinopril (PRINIVIL) 20 MG Tab 2/23/2019 Active   magnesium gluconate (MAG-G) 500 MG tablet 2/23/2019 Active   psyllium (METAMUCIL) 58.12 % Pack prn Active                ALLERGIES  Allergies   Allergen Reactions   • Pcn [Penicillins] Hives       PHYSICAL EXAM  VITAL SIGNS: /74   Pulse 85   Temp 36.6 °C (97.9 °F)   Resp 16   Ht 1.575 m (5' 2\")   Wt 61.2 kg (135 lb)   SpO2 99%   BMI 24.69 kg/m²     Constitutional:  78 year old female laying in bed who is speaking softly, alert, and able to answer questions  HENT: Nose is normal in appearance without rhinorrhea, external ears are normal,  moist mucous membranes  Eyes: Anicteric,  pupils are equal round and reactive, there is no conjunctival drainage or pallor   Neck: The trachea is midline, there is no obvious mass or meningeal signs  Cardiovascular: Equal radial pulsation, regular rate and rhythm without murmurs gallops or rubs. No bruits, good blood flow to extremities,   Thorax & Lungs: Respiratory rate and effort are normal. There is normal chest excursion with respiration. No wheezes rhonchi or rales noted.  Abdomen: Abdomen is normal in appearance, normal bowel sounds, no pain with cough, nonpulsatile  Rectal Exam: Hemoccult negative  Musculoskeletal: No deformities noted in all 4 extremities. Actively moves all 4 extremities, No lower extremity edema,  Skin: Visualized skin is warm, no erythema, no rash.  Neurologic:  Cranial nerves II through XII are intact there is no focal abnormality noted. Strength " and sensation intact.  Psychiatric: Normal mood and mentation    DIAGNOSTIC STUDIES / PROCEDURES    LABS  Results for orders placed or performed during the hospital encounter of 02/23/19   CBC WITH DIFFERENTIAL   Result Value Ref Range    WBC 5.3 4.8 - 10.8 K/uL    RBC 3.39 (L) 4.20 - 5.40 M/uL    Hemoglobin 10.5 (L) 12.0 - 16.0 g/dL    Hematocrit 31.2 (L) 37.0 - 47.0 %    MCV 92.0 81.4 - 97.8 fL    MCH 31.0 27.0 - 33.0 pg    MCHC 33.7 33.6 - 35.0 g/dL    RDW 41.4 35.9 - 50.0 fL    Platelet Count 255 164 - 446 K/uL    MPV 9.4 9.0 - 12.9 fL    Neutrophils-Polys 36.20 (L) 44.00 - 72.00 %    Lymphocytes 47.00 (H) 22.00 - 41.00 %    Monocytes 12.80 0.00 - 13.40 %    Eosinophils 2.50 0.00 - 6.90 %    Basophils 1.30 0.00 - 1.80 %    Immature Granulocytes 0.20 0.00 - 0.90 %    Nucleated RBC 0.00 /100 WBC    Neutrophils (Absolute) 1.92 (L) 2.00 - 7.15 K/uL    Lymphs (Absolute) 2.49 1.00 - 4.80 K/uL    Monos (Absolute) 0.68 0.00 - 0.85 K/uL    Eos (Absolute) 0.13 0.00 - 0.51 K/uL    Baso (Absolute) 0.07 0.00 - 0.12 K/uL    Immature Granulocytes (abs) 0.01 0.00 - 0.11 K/uL    NRBC (Absolute) 0.00 K/uL   COMP METABOLIC PANEL   Result Value Ref Range    Sodium 139 135 - 145 mmol/L    Potassium 3.6 3.6 - 5.5 mmol/L    Chloride 107 96 - 112 mmol/L    Co2 24 20 - 33 mmol/L    Anion Gap 8.0 0.0 - 11.9    Glucose 91 65 - 99 mg/dL    Bun 14 8 - 22 mg/dL    Creatinine 0.78 0.50 - 1.40 mg/dL    Calcium 10.3 8.5 - 10.5 mg/dL    AST(SGOT) 21 12 - 45 U/L    ALT(SGPT) 12 2 - 50 U/L    Alkaline Phosphatase 66 30 - 99 U/L    Total Bilirubin 0.6 0.1 - 1.5 mg/dL    Albumin 4.1 3.2 - 4.9 g/dL    Total Protein 7.0 6.0 - 8.2 g/dL    Globulin 2.9 1.9 - 3.5 g/dL    A-G Ratio 1.4 g/dL   TROPONIN   Result Value Ref Range    Troponin I <0.01 0.00 - 0.04 ng/mL   PRTOTHROMBIN TIME (INR)   Result Value Ref Range    PT 13.1 12.0 - 14.6 sec    INR 0.98 0.87 - 1.13   APTT   Result Value Ref Range    APTT 43.0 (H) 24.7 - 36.0 sec   ESTIMATED GFR   Result  Value Ref Range    GFR If African American >60 >60 mL/min/1.73 m 2    GFR If Non African American >60 >60 mL/min/1.73 m 2   EKG (NOW)   Result Value Ref Range    Report       [facility] Emergency Dept.    Test Date:  2019  Pt Name:    JUANCHO PLASCENCIA                Department: ER  MRN:        [id number]                      Room:       [facility]  Gender:     Female                       Technician: [id number]  :        [id number]                   Requested By:ER TRIAGE PROTOCOL  Order #:    [id number]                    Reading MD:    Measurements  Intervals                                Axis  Rate:       80                           P:          -19  CA:         172                          QRS:        31  QRSD:       76                           T:          37  QT:         380  QTc:        439    Interpretive Statements  SINUS RHYTHM  Compared to ECG 11/10/2012 20:57:08  Poor R-wave progression no longer present  Right ventricular hypertrophy no longer present       All labs reviewed by me.    EKG  I interpreted this EKG myself.  This is a 12-lead study.  The rhythm is sinus with a rate of 80. There are Q waves present in v3, and an isolated Q wave in lead v1. EKG is otherwise normal.    RADIOLOGY  DX-CHEST-PORTABLE (1 VIEW)   Final Result      No acute cardiopulmonary process is seen.      EC-ECHOCARDIOGRAM COMPLETE W/O CONT    (Results Pending)     The radiologist's interpretation of all radiological studies have been reviewed by me.    COURSE & MEDICAL DECISION MAKING  Nursing notes and vital signs were reviewed. (See chart for details)    The patient presents after suffering a syncopal event, and the differential diagnosis includes but is not limited to dehydration, arrhythmia, electrolyte abnormality.       7:14 PM Initial orders in the Emergency Department included DX-Chest 1 view, UA culture if indicated, CBC with differential, CMP, Troponin, Prothrombin Time, APTT, Estimated GFR and initial  treatment in the Emergency Department included Zofran 4 mg and the patient received 1 L of IV fluids given her history of hypotension. Informed her that she will be admitted to the hospital for overnight monitoring, which she understands and is comfortable with.    7:49 PM - I performed a rectal exam at this time with a female chaperone present, the findings of which are listed above. Patient has no new or worsening symptoms at this time.    7:54 PM - I spoke with Dr. Dean, Hospitalist, who agrees to admit the patient.    ED testing reveals anemia, no active bleeding noted, patient is still nauseated and dizzy and will be admitted for evaluation in hospital of symptoms    DISPOSITION:  Patient will be admitted to Dr. Dean, Hospitalist in guarded condition.     FINAL IMPRESSION  1. Syncope, unspecified syncope type          I, Pasha Sims (Scribe), am scribing for, and in the presence of, Emma Stephens M.D..    Electronically signed by: Pasha Sims (Scribe), 2/23/2019    I, Emma Stephens M.D. personally performed the services described in this documentation, as scribed by Pasha Sims in my presence, and it is both accurate and complete. C.    The note accurately reflects work and decisions made by me.  Emma Stephens  2/23/2019  9:10 PM

## 2019-02-24 NOTE — PROGRESS NOTES
Assessment completed.  Pt A&Ox4, bilat  strong and equal, no drift.  No facial droop or slurred speech.  Pt denies any numbness/tingling at this time.  Pt denies any dizziness/lightheadedness.   Respirations even, unlabored on room air. Pt denies any pain at this time.  Sinus rhythm noted on telemetry monitor.    Bed in locked, lowest position.  Call light and belongings within reach.    Pt updated on POC: awaiting echocardiogram and PT/OT.  Updated communication board. Needs met, will continue to monitor.

## 2019-02-24 NOTE — PROGRESS NOTES
I have personally seen and examined / evaluated the patient, discussed the patient's evaluation & management plan with FRANK Duran and I have reviewed the note below.     I agree with the findings, history, examination and assessment / plan as listed below with changes/addendum as listed below by me in my addendum / attestation separetely.     Patient presented with syncope, [place] local [place].  Chronic underlying anemia.  Syncope developed after alcohol use.  Monitored on telemetry no events noted.  EKG on presentation personally reviewed and interpreted by me on February 24, 2019, minimal if any concerns for ST elevations in the inferior leads.  Recommend repeating an EKG, checking a troponin level now.  Otherwise CBC stable, ongoing chronic anemia.  Vitamin B12 level is lower limit of normal, recommend supplementation with oral vitamin B12 on discharge and considering outpatient evaluation for pernicious anemia with her primary care physician.  Chemistry panel within normal limits.  Echocardiogram requested and results are pending at this time.  Will need physical therapy evaluation prior to discharge.    Patient seen and evaluated on February 24, 2019 by me.  She feels her baseline now.  She lives in [place], [place].  She visits [employer] for gambling.  Patient reports that this is the fourth time she has had a syncopal episode while in [employer].  She is at her baseline now, no chest pain, shortness of breath.  No chest pain or shortness of breath with a syncopal episode.  Felt dizzy and lightheaded.  Overall history, presentation consistent with vasovagal/orthostatic etiologies of the syncope.  On examination, S1-S2 no murmurs rubs or gallops noted.  Normal rhythm.  No carotid bruit.  Lungs are clear on auscultation bilaterally.    Recommendations:  1.  Regarding syncope await the results of her echocardiogram, obtain physical therapy evaluation and repeat an EKG and troponin.  2.  Recommend  transitioning her hypertensive regimen to lisinopril 10 mg twice a day and amlodipine 2.5 mg twice a day, ongoing blood pressure monitoring at home and share the results with her primary care physician.  3.  Anemia, chronic needs ongoing outpatient evaluation with her primary care physician.  Consider B12 supplementation on discharge and evaluation for pernicious anemia with her PCP following discharge.    Essence Spangler M.D.  02/24/19  8:17 AM

## 2019-02-24 NOTE — ED TRIAGE NOTES
Pt was at the [employer] playing slots, began to feel dizzy s while sitting , stood up and had a witnessed syncopal event

## 2019-02-24 NOTE — DISCHARGE SUMMARY
Discharge Summary    CHIEF COMPLAINT ON ADMISSION  Chief Complaint   Patient presents with   • Syncope       Reason for Admission  syncope     Admission Date  2/23/2019    CODE STATUS  Full Code    HPI & HOSPITAL COURSE  This is a 78 y.o. female with a past medical history of hypertension, hypothyroidism here after a syncopal episode.  The patient had apparently just eaten dinner and had multiple alcoholic beverages.  She was sitting down playing a slot machine when she developed lightheadedness with a resulting syncopal episode.  The patient returned to baseline quickly after this episode.  On admission EKG was stable.  Troponin was negative.  Echocardiogram revealed an LVEF of 70% with no acute abnormalities.  Patient was suspected to have had a vasovagal event.  She was hydrated over admission.  She maintained stable vital signs and return to her baseline level of functioning.  She was also incidentally noted to have mild anemia with no evidence of active bleed and can have this further followed up as an outpatient with her PCP.  She was educated on the need to maintain adequate hydration daily.  She verbalized understanding of this.  At this time there is no further need for acute intervention.       Therefore, she is discharged in good and stable condition to home with close outpatient follow-up.    Discharge Date  2/24/2019    DISCHARGE DIAGNOSES  Active Problems:    Syncope POA: Yes    Essential hypertension POA: Yes    Hypothyroidism POA: Yes    Anemia POA: Yes  Resolved Problems:    * No resolved hospital problems. *      FOLLOW UP  She will follow with her PCP in 1-2 weeks.    MEDICATIONS ON DISCHARGE     Medication List      ASK your doctor about these medications      Instructions   amLODIPine 5 MG Tabs  Commonly known as:  NORVASC   Take 5 mg by mouth every day.  Dose:  5 mg     CALCIUM 500 PO   Take 1 Tab by mouth every day.  Dose:  1 Tab     levothyroxine 125 MCG Tabs  Commonly known as:  SYNTHROID    Take 62.5 mcg by mouth Every morning on an empty stomach.  Dose:  62.5 mcg     lisinopril 20 MG Tabs  Commonly known as:  PRINIVIL   Take 20 mg by mouth every day.  Dose:  20 mg     magnesium gluconate 500 MG tablet  Commonly known as:  MAG-G   Take 500 mg by mouth every day.  Dose:  500 mg     psyllium 58.12 % Pack  Commonly known as:  METAMUCIL   Take 1 Packet by mouth as needed.  Dose:  1 Packet     vitamin D 2000 UNIT Tabs   Take 2,000 Units by mouth every day.  Dose:  2000 Units            Allergies  Allergies   Allergen Reactions   • Pcn [Penicillins] Hives       DIET  Orders Placed This Encounter   Procedures   • Diet Order Cardiac     Standing Status:   Standing     Number of Occurrences:   1     Order Specific Question:   Diet:     Answer:   Cardiac [6]       ACTIVITY  As tolerated.    LABORATORY  Lab Results   Component Value Date    SODIUM 141 02/24/2019    POTASSIUM 4.3 02/24/2019    CHLORIDE 113 (H) 02/24/2019    CO2 23 02/24/2019    GLUCOSE 105 (H) 02/24/2019    BUN 15 02/24/2019    CREATININE 0.66 02/24/2019        Lab Results   Component Value Date    WBC 5.5 02/24/2019    HEMOGLOBIN 9.7 (L) 02/24/2019    HEMATOCRIT 29.2 (L) 02/24/2019    PLATELETCT 233 02/24/2019